# Patient Record
Sex: MALE | Employment: FULL TIME | URBAN - METROPOLITAN AREA
[De-identification: names, ages, dates, MRNs, and addresses within clinical notes are randomized per-mention and may not be internally consistent; named-entity substitution may affect disease eponyms.]

---

## 2018-10-10 ENCOUNTER — NURSE TRIAGE (OUTPATIENT)
Dept: NURSING | Facility: CLINIC | Age: 58
End: 2018-10-10

## 2018-10-10 NOTE — TELEPHONE ENCOUNTER
Reason for Disposition    [1] Caller requesting NON-URGENT health information AND [2] PCP's office is the best resource    Additional Information    Negative: [1] Caller is not with the adult (patient) AND [2] reporting urgent symptoms    Negative: Lab result questions    Negative: Medication questions    Negative: Caller cannot be reached by phone    Negative: Caller has already spoken to PCP or another triager    Negative: RN needs further essential information from caller in order to complete triage    Negative: Requesting regular office appointment    Protocols used: INFORMATION ONLY CALL-ADULT-    Rao calls and says that on 10/8/2018, he saw his Dr. Pt. Says that he is very concerned about his ALT & AST values. Pt. Says that is ALT = 137 and his AST = 67. Pt. Says that these #s have really jumped up, compared to last year. Pt. Says that he has an appointment to see his DrDolores, on Monday, but wants to speak with him before then. Pt. Says that he will call his clinic tomorrow am, and speak to his

## 2019-11-02 ENCOUNTER — NURSE TRIAGE (OUTPATIENT)
Dept: NURSING | Facility: CLINIC | Age: 59
End: 2019-11-02

## 2019-11-02 NOTE — TELEPHONE ENCOUNTER
"\"I was seen the other day in clinic and the doctor forgot to send over my Adderall RX .   Advised to call the clinic on Monday when open or go to ER.   Maricarmen Lizarraga RN [facility]      " Sent to RN pool to be triaged.

## 2021-05-29 ENCOUNTER — RECORDS - HEALTHEAST (OUTPATIENT)
Dept: ADMINISTRATIVE | Facility: CLINIC | Age: 61
End: 2021-05-29

## 2023-10-28 ENCOUNTER — NURSE TRIAGE (OUTPATIENT)
Dept: NURSING | Facility: CLINIC | Age: 63
End: 2023-10-28

## 2023-10-29 NOTE — TELEPHONE ENCOUNTER
"Rao reports new onset today of Heart Palpitations - Skipped beats    - Today, all day, noting skipped beats   - Feels it in chest & can hear it with a stethoscope  - Occurring 4+ times a minute    Last few weeks:  - Lt side achy \"muscular\" pain - under Lt breast radiating toward shoulder - Not currently feeling it today  - Improved when laying down - Worsened when getting up  - Lightheadedness when getting up  - Tingling in fingers intermittent    Hx Asthma    ER advised - He will go to [facility]    Sisi Armendariz RN  [facility]      Reason for Disposition   Difficulty breathing    Additional Information   Negative: Passed out (i.e., lost consciousness, collapsed and was not responding)   Negative: Shock suspected (e.g., cold/pale/clammy skin, too weak to stand, low BP, rapid pulse)   Negative: Difficult to awaken or acting confused (e.g., disoriented, slurred speech)   Negative: Visible sweat on face or sweat dripping down face   Negative: Unable to walk, or can only walk with assistance (e.g., requires support)   Negative: [1] Received SHOCK from implantable cardiac defibrillator AND [2] persisting symptoms (i.e., palpitations, lightheadedness)   Negative: [1] Dizziness, lightheadedness, or weakness AND [2] heart beating very rapidly (e.g., > 140 / minute)   Negative: [1] Dizziness, lightheadedness, or weakness AND [2] heart beating very slowly (e.g., < 50 / minute)   Negative: Sounds like a life-threatening emergency to the triager   Negative: Chest pain   Negative: Implantable Cardiac Defibrillator (ICD) or a pacemaker symptoms or questions    Protocols used: Heart Rate and Heartbeat [id number]    "